# Patient Record
Sex: FEMALE | Race: AMERICAN INDIAN OR ALASKA NATIVE
[De-identification: names, ages, dates, MRNs, and addresses within clinical notes are randomized per-mention and may not be internally consistent; named-entity substitution may affect disease eponyms.]

---

## 2018-04-20 ENCOUNTER — HOSPITAL ENCOUNTER (EMERGENCY)
Dept: HOSPITAL 5 - ED | Age: 23
Discharge: HOME | End: 2018-04-20
Payer: MEDICAID

## 2018-04-20 VITALS — DIASTOLIC BLOOD PRESSURE: 68 MMHG | SYSTOLIC BLOOD PRESSURE: 108 MMHG

## 2018-04-20 DIAGNOSIS — J02.0: Primary | ICD-10-CM

## 2018-04-20 LAB
BACTERIA #/AREA URNS HPF: (no result) /HPF
BILIRUB UR QL STRIP: (no result)
BLOOD UR QL VISUAL: (no result)
HYALINE CASTS #/AREA URNS LPF: 2 /LPF
MUCOUS THREADS #/AREA URNS HPF: (no result) /HPF
PH UR STRIP: 5 [PH] (ref 5–7)
PROT UR STRIP-MCNC: (no result) MG/DL
RBC #/AREA URNS HPF: 2 /HPF (ref 0–6)
UROBILINOGEN UR-MCNC: 2 MG/DL (ref ?–2)
WBC #/AREA URNS HPF: 5 /HPF (ref 0–6)

## 2018-04-20 PROCEDURE — 87400 INFLUENZA A/B EACH AG IA: CPT

## 2018-04-20 PROCEDURE — 81001 URINALYSIS AUTO W/SCOPE: CPT

## 2018-04-20 PROCEDURE — 87430 STREP A AG IA: CPT

## 2018-04-20 PROCEDURE — 71046 X-RAY EXAM CHEST 2 VIEWS: CPT

## 2018-04-20 NOTE — EMERGENCY DEPARTMENT REPORT
ED ENT HPI





- General


Chief complaint: Upper Respiratory Infection


Stated complaint: HEAD,THROAT PAIN


Time Seen by Provider: 04/20/18 12:04


Source: patient


Mode of arrival: Ambulatory


Limitations: No Limitations





- History of Present Illness


Initial comments: 


22-year-old female past medical history none presents with complaint of 2-3 

days of sore throat and nonproductive cough.  Patient is awake alert and 

oriented 3.  Fully lucid.  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


MD complaint: sore throat


Onset/Timing: 3


-: days(s)


Location: throat


Severity: moderate


Severity scale (0 -10): 5


Quality: aching


Consistency: intermittent


Improves with: none


Associated Symptoms: sore throat





- Related Data


 Previous Rx's











 Medication  Instructions  Recorded  Last Taken  Type


 


Amoxicillin/K Clav Tab [Augmentin 1 tab PO Q12HR #20 tab 04/20/18 Unknown Rx





875 mg]    


 


Dextromethorphan/Benzocaine 1 each PO Q4H PRN #1 lozenge 04/20/18 Unknown Rx





[Cepacol Sorethroat-Cough Rocio]    


 


Ibuprofen [Motrin] 600 mg PO Q8H PRN #20 tablet 04/20/18 Unknown Rx











 Allergies











Allergy/AdvReac Type Severity Reaction Status Date / Time


 


No Known Allergies Allergy   Unverified 04/20/18 09:42














ED Dental HPI





- General


Chief complaint: Upper Respiratory Infection


Stated complaint: HEAD,THROAT PAIN


Time Seen by Provider: 04/20/18 12:04


Source: patient


Mode of arrival: Ambulatory


Limitations: No Limitations





- Related Data


 Previous Rx's











 Medication  Instructions  Recorded  Last Taken  Type


 


Amoxicillin/K Clav Tab [Augmentin 1 tab PO Q12HR #20 tab 04/20/18 Unknown Rx





875 mg]    


 


Dextromethorphan/Benzocaine 1 each PO Q4H PRN #1 lozenge 04/20/18 Unknown Rx





[Cepacol Sorethroat-Cough Rocio]    


 


Ibuprofen [Motrin] 600 mg PO Q8H PRN #20 tablet 04/20/18 Unknown Rx











 Allergies











Allergy/AdvReac Type Severity Reaction Status Date / Time


 


No Known Allergies Allergy   Unverified 04/20/18 09:42














ED Review of Systems


ROS: 


Stated complaint: HEAD,THROAT PAIN


Other details as noted in HPI





Constitutional: denies: chills, fever


Eyes: denies: eye pain, eye discharge, vision change


ENT: throat pain.  denies: ear pain


Respiratory: cough.  denies: shortness of breath, wheezing


Cardiovascular: denies: chest pain, palpitations


Endocrine: no symptoms reported


Gastrointestinal: denies: abdominal pain, nausea, diarrhea


Genitourinary: denies: urgency, dysuria, discharge


Musculoskeletal: denies: back pain, joint swelling, arthralgia


Skin: denies: rash, lesions


Neurological: denies: headache, weakness, paresthesias


Psychiatric: denies: anxiety, depression


Hematological/Lymphatic: denies: easy bleeding, easy bruising





ED Past Medical Hx





- Past Medical History


Previous Medical History?: No





- Surgical History


Past Surgical History?: No





- Social History


Smoking Status: Never Smoker


Substance Use Type: None





- Medications


Home Medications: 


 Home Medications











 Medication  Instructions  Recorded  Confirmed  Last Taken  Type


 


Amoxicillin/K Clav Tab [Augmentin 1 tab PO Q12HR #20 tab 04/20/18  Unknown Rx





875 mg]     


 


Dextromethorphan/Benzocaine 1 each PO Q4H PRN #1 lozenge 04/20/18  Unknown Rx





[Cepacol Sorethroat-Cough Rocio]     


 


Ibuprofen [Motrin] 600 mg PO Q8H PRN #20 tablet 04/20/18  Unknown Rx














ED Physical Exam





- General


Limitations: No Limitations


General appearance: alert, in no apparent distress





- Head


Head exam: Present: atraumatic, normocephalic





- Eye


Eye exam: Present: normal appearance, PERRL, EOMI





- ENT


ENT exam: Present: mucous membranes moist





- Neck


Neck exam: Present: normal inspection





- Respiratory


Respiratory exam: Present: normal lung sounds bilaterally.  Absent: respiratory 

distress





- Cardiovascular


Cardiovascular Exam: Present: regular rate, normal rhythm.  Absent: systolic 

murmur, diastolic murmur, rubs, gallop





- GI/Abdominal


GI/Abdominal exam: Present: soft, normal bowel sounds





- Extremities Exam


Extremities exam: Present: normal inspection





- Back Exam


Back exam: Present: normal inspection





- Neurological Exam


Neurological exam: Present: alert, oriented X3, CN II-XII intact, normal gait





- Psychiatric


Psychiatric exam: Present: normal affect, normal mood





- Skin


Skin exam: Present: warm, dry, intact, normal color.  Absent: rash





ED Course





 Vital Signs











  04/20/18 04/20/18





  09:42 12:14


 


Temperature 100.7 F H 


 


Pulse Rate 107 H 


 


Respiratory 18 18





Rate  


 


Blood Pressure 116/85 


 


O2 Sat by Pulse 99 





Oximetry  














ED Medical Decision Making





- Medical Decision Making


A/P: Strep throat


1- Augmentin, Motrin, throat lozenges


2- f/u with primary care doctor


3- can tolerate by mouth fluid and food before discharge





Critical care attestation.: 


If time is entered above; I have spent that time in minutes in the direct care 

of this critically ill patient, excluding procedure time.








ED Disposition


Clinical Impression: 


 Strep throat





Disposition: DC-01 TO HOME OR SELFCARE


Is pt being admited?: No


Does the pt Need Aspirin: No


Condition: Stable


Instructions:  Strep Throat (ED), Tonsillitis (ED)


Prescriptions: 


Amoxicillin/K Clav Tab [Augmentin 875 mg] 1 tab PO Q12HR #20 tab


Dextromethorphan/Benzocaine [Cepacol Sorethroat-Cough Rocio] 1 each PO Q4H PRN #1 

lozenge


 PRN Reason: Sore Throat


Ibuprofen [Motrin] 600 mg PO Q8H PRN #20 tablet


 PRN Reason: Pain


Referrals: 


[facility] [Outside] - 3-5 Days


Critical access hospital [Outside] - 3-5 Days


Forms:  Work/School Release Form(ED)


Time of Disposition: 13:20

## 2018-04-20 NOTE — XRAY REPORT
CHEST 2 VIEWS



INDICATION: Cough.



COMPARISON: None similar.



FINDINGS: PA and lateral chest radiographs demonstrate normal 

cardiomediastinal silhouette. Clear lungs. Intact bones.



CONCLUSION: No acute disease in the chest.



Thank you for the opportunity to participate in this patient's care.

## 2018-04-20 NOTE — EMERGENCY DEPARTMENT REPORT
Blank Doc





- Documentation


Documentation: 





21yo female with no significant PMHx came in complaining of runny nose, cough, 

congestion x 1 day. Pt works in day care. Denies n/v/cp/sob, denies neck pain, 

denies neck stiffness

## 2018-07-07 ENCOUNTER — HOSPITAL ENCOUNTER (EMERGENCY)
Dept: HOSPITAL 5 - ED | Age: 23
Discharge: HOME | End: 2018-07-07
Payer: MEDICAID

## 2018-07-07 VITALS — DIASTOLIC BLOOD PRESSURE: 66 MMHG | SYSTOLIC BLOOD PRESSURE: 126 MMHG

## 2018-07-07 DIAGNOSIS — O20.0: Primary | ICD-10-CM

## 2018-07-07 DIAGNOSIS — Z3A.01: ICD-10-CM

## 2018-07-07 LAB
ALBUMIN SERPL-MCNC: 4.5 G/DL (ref 3.9–5)
ALT SERPL-CCNC: 11 UNITS/L (ref 7–56)
BASOPHILS # (AUTO): 0 K/MM3 (ref 0–0.1)
BASOPHILS NFR BLD AUTO: 0.6 % (ref 0–1.8)
BILIRUB UR QL STRIP: (no result)
BLOOD UR QL VISUAL: (no result)
BUN SERPL-MCNC: 10 MG/DL (ref 7–17)
BUN/CREAT SERPL: 17 %
CALCIUM SERPL-MCNC: 9.3 MG/DL (ref 8.4–10.2)
EOSINOPHIL # BLD AUTO: 0.1 K/MM3 (ref 0–0.4)
EOSINOPHIL NFR BLD AUTO: 1.4 % (ref 0–4.3)
HCT VFR BLD CALC: 29.8 % (ref 30.3–42.9)
HEMOLYSIS INDEX: 0
HGB BLD-MCNC: 9 GM/DL (ref 10.1–14.3)
LIPASE SERPL-CCNC: 31 UNITS/L (ref 13–60)
LYMPHOCYTES # BLD AUTO: 1.9 K/MM3 (ref 1.2–5.4)
LYMPHOCYTES NFR BLD AUTO: 32.6 % (ref 13.4–35)
MCH RBC QN AUTO: 17 PG (ref 28–32)
MCHC RBC AUTO-ENTMCNC: 30 % (ref 30–34)
MCV RBC AUTO: 55 FL (ref 79–97)
MONOCYTES # (AUTO): 0.4 K/MM3 (ref 0–0.8)
MONOCYTES % (AUTO): 6.2 % (ref 0–7.3)
MUCOUS THREADS #/AREA URNS HPF: (no result) /HPF
PH UR STRIP: 5 [PH] (ref 5–7)
PLATELET # BLD: 215 K/MM3 (ref 140–440)
PROT UR STRIP-MCNC: (no result) MG/DL
RBC # BLD AUTO: 5.41 M/MM3 (ref 3.65–5.03)
RBC #/AREA URNS HPF: 3 /HPF (ref 0–6)
UROBILINOGEN UR-MCNC: 4 MG/DL (ref ?–2)
WBC #/AREA URNS HPF: 1 /HPF (ref 0–6)

## 2018-07-07 PROCEDURE — 85025 COMPLETE CBC W/AUTO DIFF WBC: CPT

## 2018-07-07 PROCEDURE — 99284 EMERGENCY DEPT VISIT MOD MDM: CPT

## 2018-07-07 PROCEDURE — 83690 ASSAY OF LIPASE: CPT

## 2018-07-07 PROCEDURE — 86900 BLOOD TYPING SEROLOGIC ABO: CPT

## 2018-07-07 PROCEDURE — 76817 TRANSVAGINAL US OBSTETRIC: CPT

## 2018-07-07 PROCEDURE — 84702 CHORIONIC GONADOTROPIN TEST: CPT

## 2018-07-07 PROCEDURE — 86901 BLOOD TYPING SEROLOGIC RH(D): CPT

## 2018-07-07 PROCEDURE — 76801 OB US < 14 WKS SINGLE FETUS: CPT

## 2018-07-07 PROCEDURE — 36415 COLL VENOUS BLD VENIPUNCTURE: CPT

## 2018-07-07 PROCEDURE — 81001 URINALYSIS AUTO W/SCOPE: CPT

## 2018-07-07 PROCEDURE — 81025 URINE PREGNANCY TEST: CPT

## 2018-07-07 PROCEDURE — 86850 RBC ANTIBODY SCREEN: CPT

## 2018-07-07 PROCEDURE — 80053 COMPREHEN METABOLIC PANEL: CPT

## 2018-07-07 NOTE — ULTRASOUND REPORT
FINAL REPORT



PROCEDURE:  US OB &lt; = 14 WEEKS FETUS



TECHNIQUE:  Real-time transabdominal and transvaginal sonography

of the uterus, placenta, amniotic fluid, adnexa, and fetus was

performed with image documentation. Measurements were obtained to

determine fetal age/size. M-mode Doppler was used to document

fetal heartbeat. CPT 82987 



HISTORY:  Pregnant. 



COMPARISON:  No prior studies are available for comparison.



FINDINGS:  

LMP: 06/12/2018. Clinical age: 3 weeks 4 days. EDC: 03/19/2019. 



UTERUS



Size: 8.2 x 3.8 x 5.1 cm.



Endometrial thickness: 3.8 mm.



Orientation: anteverted.



Cervix: Normal.



Fibroids/masses: None.



RIGHT Ovary: 3.5 x 2.2 x 2.0 cm. 



Appearance: 1.4 cm complex cystic lesion. 1.2 cm complex cystic

lesion. Normal flow.



LEFT Ovary: 3.5 x 2.1 x 1.7 cm.



Appearance: 1.2 cm complex cystic lesion. Normal flow.



Pelvic fluid: None.



Other: None.







IMPRESSION:  

No sonographic evidence of intrauterine pregnancy. Consider

findings could represent very early pregnancy that is not yet

sonographically apparent. Although ectopic cannot be completely

excluded, lesions in the ovaries felt to more likely represent

complex cysts. Recommend clinical correlation, correlation with

beta HCG, and short-term followup pelvic ultrasound.

## 2018-07-07 NOTE — ULTRASOUND REPORT
FINAL REPORT



PROCEDURE:  US OB &lt; = 14 WEEKS FETUS



TECHNIQUE:  Real-time transabdominal and transvaginal sonography

of the uterus, placenta, amniotic fluid, adnexa, and fetus was

performed with image documentation. Measurements were obtained to

determine fetal age/size. M-mode Doppler was used to document

fetal heartbeat. CPT 71028 



HISTORY:  Pregnant. 



COMPARISON:  No prior studies are available for comparison.



FINDINGS:  

LMP: 06/12/2018. Clinical age: 3 weeks 4 days. EDC: 03/19/2019. 



UTERUS



Size: 8.2 x 3.8 x 5.1 cm.



Endometrial thickness: 3.8 mm.



Orientation: anteverted.



Cervix: Normal.



Fibroids/masses: None.



RIGHT Ovary: 3.5 x 2.2 x 2.0 cm. 



Appearance: 1.4 cm complex cystic lesion. 1.2 cm complex cystic

lesion. Normal flow.



LEFT Ovary: 3.5 x 2.1 x 1.7 cm.



Appearance: 1.2 cm complex cystic lesion. Normal flow.



Pelvic fluid: None.



Other: None.







IMPRESSION:  

No sonographic evidence of intrauterine pregnancy. Consider

findings could represent very early pregnancy that is not yet

sonographically apparent. Although ectopic cannot be completely

excluded, lesions in the ovaries felt to more likely represent

complex cysts. Recommend clinical correlation, correlation with

beta HCG, and short-term followup pelvic ultrasound.

## 2018-07-07 NOTE — EMERGENCY DEPARTMENT REPORT
ED Abdominal Pain HPI





- General


Chief Complaint: Abdominal Pain


Stated Complaint: STOMACH CRAMPS


Time Seen by Provider: 18 14:19


Source: patient


Mode of arrival: Ambulatory


Limitations: No Limitations





- History of Present Illness


Initial Comments: 





Lower abdominal pain cramps for 2 days no nausea vomiting diarrhea no missed 

periods no vaginal complaints however on further questioning does states she 

had a little vaginal bleeding yesterday she thinks her periods have been a 

little bit irregular she's not sure if she missed.  No dysuria no migratory 

symptoms no fever no back pain no vaginal discharge


MD Complaint: abdominal pain


-: Gradual, unknown


Location: diffuse


Severity: mild, moderate


Severity scale (0 -10): 0


Quality: cramping


Consistency: intermittent


Associated Symptoms: denies other symptoms.  denies: nausea, vomiting, diarrhea

, fever, chills, constipation, dysuria, hematemesis, hematochezia, melena, 

hematuria, anorexia, syncope





- Related Data


 Previous Rx's











 Medication  Instructions  Recorded  Last Taken  Type


 


Amoxicillin/K Clav Tab [Augmentin 1 tab PO Q12HR #20 tab 18 Unknown Rx





875 mg]    


 


Dextromethorphan/Benzocaine 1 each PO Q4H PRN #1 lozenge 18 Unknown Rx





[Cepacol Sorethroat-Cough Rocio]    


 


Ibuprofen [Motrin] 600 mg PO Q8H PRN #20 tablet 18 Unknown Rx











 Allergies











Allergy/AdvReac Type Severity Reaction Status Date / Time


 


No Known Allergies Allergy   Unverified 18 09:42














ED Review of Systems


ROS: 


Stated complaint: STOMACH CRAMPS


Other details as noted in HPI





Comment: All other systems reviewed and negative


Constitutional: denies: diaphoresis, fever, malaise


Eyes: denies: eye discharge, vision change


ENT: denies: dental pain, hearing loss, epistaxis


Respiratory: denies: shortness of breath, SOB with exertion, SOB at rest, 

stridor


Cardiovascular: denies: chest pain, palpitations, dyspnea on exertion, orthopnea

, edema, syncope


Gastrointestinal: abdominal pain.  denies: nausea, vomiting, diarrhea, 

constipation, hematemesis, melena, hematochezia


Neurological: denies: headache, weakness, numbness, paresthesias, confusion, 

abnormal gait, vertigo


Hematological/Lymphatic: denies: easy bleeding, easy bruising





ED Past Medical Hx





- Past Medical History


Previous Medical History?: No





- Surgical History


Past Surgical History?: No





- Social History


Smoking Status: Never Smoker


Substance Use Type: None





- Medications


Home Medications: 


 Home Medications











 Medication  Instructions  Recorded  Confirmed  Last Taken  Type


 


Amoxicillin/K Clav Tab [Augmentin 1 tab PO Q12HR #20 tab 18  Unknown Rx





875 mg]     


 


Dextromethorphan/Benzocaine 1 each PO Q4H PRN #1 lozenge 18  Unknown Rx





[Cepacol Sorethroat-Cough Rocio]     


 


Ibuprofen [Motrin] 600 mg PO Q8H PRN #20 tablet 18  Unknown Rx














ED Physical Exam





- General


Limitations: No Limitations


General appearance: alert, in no apparent distress, anxious





- Head


Head exam: Present: atraumatic, normocephalic





- Eye


Eye exam: Present: normal appearance, PERRL, EOMI





- ENT


ENT exam: Present: normal exam, normal orophraynx





- Neck


Neck exam: Present: normal inspection.  Absent: tenderness, meningismus





- Respiratory


Respiratory exam: Present: normal lung sounds bilaterally.  Absent: respiratory 

distress, wheezes, rales, rhonchi, stridor





- Cardiovascular


Cardiovascular Exam: Present: regular rate, normal rhythm





- GI/Abdominal


GI/Abdominal exam: Present: soft.  Absent: distended, tenderness, guarding, 

rebound, rigid, mass, pulsatile mass





- Extremities Exam


Extremities exam: Present: normal inspection, normal capillary refill.  Absent: 

pedal edema, joint swelling





- Back Exam


Back exam: Present: normal inspection, full ROM.  Absent: tenderness, CVA 

tenderness (R), CVA tenderness (L), muscle spasm





- Neurological Exam


Neurological exam: Present: alert, oriented X3, CN II-XII intact.  Absent: 

motor sensory deficit





- Skin


Skin exam: Present: warm





ED Course


 Vital Signs











  18





  12:53


 


Temperature 98.9 F


 


Pulse Rate 71


 


Respiratory 16





Rate 


 


Blood Pressure 113/72


 


O2 Sat by Pulse 100





Oximetry 














ED Medical Decision Making





- Lab Data


Result diagrams: 


 18 14:41





 18 14:41





- Radiology Data


Radiology results: report reviewed





- Medical Decision Making





No heavy bleeding, not soaking pads, vital signs are stable, not orthostatic H&

H appears close to baseline, quantitative hCG is low this likely represents 

threatened  however she will need serial exams with serial measurements 

on ultrasound she will be referred to OB/GYN A blood type is pending she is 

still for a post follow-up should be noted that there was no acute abd exam at 

this time is felt to be stable for outpatient follow-up was given ectopic 

precautions verbalized understanding


Critical care attestation.: 


If time is entered above; I have spent that time in minutes in the direct care 

of this critically ill patient, excluding procedure time.








ED Disposition


Clinical Impression: 


 Threatened , Abdominal pain





Disposition:  TO HOME OR SELFCARE


Is pt being admited?: No


Condition: Stable


Instructions:  Abdominal Pain (ED), Threatened Miscarriage (ED)


Additional Instructions: 


see The doctor listed, return if new or alarming symptoms such as worse pain 

bleeding fainting or other problems or soaking pads


Referrals: 


PRIMARY CARE,MD [Primary Care Provider] - 3-5 Days


RJ CHAUDHARI [Staff Physician] - 3-5 Days


Time of Disposition: 18:19